# Patient Record
Sex: MALE | Race: WHITE | NOT HISPANIC OR LATINO | Employment: FULL TIME | URBAN - METROPOLITAN AREA
[De-identification: names, ages, dates, MRNs, and addresses within clinical notes are randomized per-mention and may not be internally consistent; named-entity substitution may affect disease eponyms.]

---

## 2019-12-13 ENCOUNTER — HOSPITAL ENCOUNTER (OUTPATIENT)
Dept: URGENT CARE | Facility: CLINIC | Age: 24
Discharge: HOME | End: 2019-12-13
Attending: FAMILY MEDICINE

## 2020-02-10 ENCOUNTER — HOSPITAL ENCOUNTER (OUTPATIENT)
Dept: LAB | Facility: HOSPITAL | Age: 25
Discharge: HOME | End: 2020-02-10
Attending: NURSE PRACTITIONER

## 2020-02-10 LAB
25(OH)D3 SERPL-MCNC: 13.6 NG/ML (ref 30–100)
ALBUMIN SERPL-MCNC: 4.9 G/DL (ref 3.5–5)
ALBUMIN/GLOB SERPL: 1.6 {RATIO} (ref 1.4–2.6)
ALP SERPL-CCNC: 88 U/L (ref 53–128)
ALT SERPL-CCNC: 12 U/L (ref 10–40)
ANION GAP SERPL CALC-SCNC: 18 MMOL/L (ref 8–19)
AST SERPL-CCNC: 14 U/L (ref 15–50)
BASOPHILS # BLD AUTO: 0.04 10*3/UL (ref 0–0.2)
BASOPHILS NFR BLD AUTO: 0.7 % (ref 0–3)
BILIRUB SERPL-MCNC: 0.28 MG/DL (ref 0.2–1.3)
BUN SERPL-MCNC: 15 MG/DL (ref 5–25)
BUN/CREAT SERPL: 17 {RATIO} (ref 6–20)
CALCIUM SERPL-MCNC: 9.5 MG/DL (ref 8.7–10.4)
CHLORIDE SERPL-SCNC: 100 MMOL/L (ref 99–111)
CHOLEST SERPL-MCNC: 147 MG/DL (ref 107–200)
CHOLEST/HDLC SERPL: 4.2 {RATIO} (ref 3–6)
CONV ABS IMM GRAN: 0.02 10*3/UL (ref 0–0.2)
CONV CO2: 27 MMOL/L (ref 22–32)
CONV IMMATURE GRAN: 0.4 % (ref 0–1.8)
CONV TOTAL PROTEIN: 7.9 G/DL (ref 6.3–8.2)
CREAT UR-MCNC: 0.89 MG/DL (ref 0.7–1.2)
DEPRECATED RDW RBC AUTO: 40.2 FL (ref 35.1–43.9)
EOSINOPHIL # BLD AUTO: 0.28 10*3/UL (ref 0–0.7)
EOSINOPHIL # BLD AUTO: 5 % (ref 0–7)
ERYTHROCYTE [DISTWIDTH] IN BLOOD BY AUTOMATED COUNT: 12.3 % (ref 11.6–14.4)
EST. AVERAGE GLUCOSE BLD GHB EST-MCNC: 114 MG/DL
FOLATE SERPL-MCNC: 8.3 NG/ML (ref 4.8–20)
GFR SERPLBLD BASED ON 1.73 SQ M-ARVRAT: >60 ML/MIN/{1.73_M2}
GLOBULIN UR ELPH-MCNC: 3 G/DL (ref 2–3.5)
GLUCOSE SERPL-MCNC: 100 MG/DL (ref 70–99)
HBA1C MFR BLD: 5.6 % (ref 3.5–5.7)
HCT VFR BLD AUTO: 43.2 % (ref 42–52)
HDLC SERPL-MCNC: 35 MG/DL (ref 40–60)
HGB BLD-MCNC: 14.2 G/DL (ref 14–18)
IRON SERPL-MCNC: 59 UG/DL (ref 70–180)
LDLC SERPL CALC-MCNC: 103 MG/DL (ref 70–100)
LYMPHOCYTES # BLD AUTO: 1.54 10*3/UL (ref 1–5)
LYMPHOCYTES NFR BLD AUTO: 27.3 % (ref 20–45)
MCH RBC QN AUTO: 29.5 PG (ref 27–31)
MCHC RBC AUTO-ENTMCNC: 32.9 G/DL (ref 33–37)
MCV RBC AUTO: 89.8 FL (ref 80–96)
MONOCYTES # BLD AUTO: 0.56 10*3/UL (ref 0.2–1.2)
MONOCYTES NFR BLD AUTO: 9.9 % (ref 3–10)
NEUTROPHILS # BLD AUTO: 3.21 10*3/UL (ref 2–8)
NEUTROPHILS NFR BLD AUTO: 56.7 % (ref 30–85)
NRBC CBCN: 0 % (ref 0–0.7)
OSMOLALITY SERPL CALC.SUM OF ELEC: 293 MOSM/KG (ref 273–304)
PLATELET # BLD AUTO: 227 10*3/UL (ref 130–400)
PMV BLD AUTO: 11 FL (ref 9.4–12.4)
POTASSIUM SERPL-SCNC: 4.2 MMOL/L (ref 3.5–5.3)
RBC # BLD AUTO: 4.81 10*6/UL (ref 4.7–6.1)
SODIUM SERPL-SCNC: 141 MMOL/L (ref 135–147)
TRIGL SERPL-MCNC: 46 MG/DL (ref 40–150)
TSH SERPL-ACNC: 4.06 M[IU]/L (ref 0.27–4.2)
VIT B12 SERPL-MCNC: 522 PG/ML (ref 211–911)
VLDLC SERPL-MCNC: 9 MG/DL (ref 5–37)
WBC # BLD AUTO: 5.65 10*3/UL (ref 4.8–10.8)

## 2020-05-29 ENCOUNTER — HOSPITAL ENCOUNTER (OUTPATIENT)
Dept: URGENT CARE | Facility: CLINIC | Age: 25
Discharge: HOME | End: 2020-05-29

## 2021-11-19 ENCOUNTER — TELEPHONE (OUTPATIENT)
Dept: FAMILY MEDICINE CLINIC | Age: 26
End: 2021-11-19

## 2021-11-19 NOTE — TELEPHONE ENCOUNTER
PATIENT HAD BLEEDING DURING BOWEL MOVEMENT    I TOLD HIM TO GO TO THE ER AND THE WIFE STATED THAT SHE WOULD TAKE HIM.     SAFETY REPORT DONE.

## 2021-11-21 ENCOUNTER — HOSPITAL ENCOUNTER (EMERGENCY)
Facility: HOSPITAL | Age: 26
Discharge: LEFT BEFORE DISCHARGE | End: 2021-11-21

## 2021-11-21 VITALS
BODY MASS INDEX: 19.98 KG/M2 | DIASTOLIC BLOOD PRESSURE: 78 MMHG | SYSTOLIC BLOOD PRESSURE: 126 MMHG | HEIGHT: 74 IN | WEIGHT: 155.65 LBS | OXYGEN SATURATION: 97 % | HEART RATE: 73 BPM | TEMPERATURE: 98.4 F | RESPIRATION RATE: 14 BRPM

## 2021-11-21 PROCEDURE — 99211 OFF/OP EST MAY X REQ PHY/QHP: CPT

## 2021-11-21 NOTE — ED TRIAGE NOTES
Patient presents to ED with complaints of one episode of blood in his stool on Tuesday. States that he occasionally has blood on toilet paper when he wipes but has not had any other instances since Tuesday. Denies any current bleeding or abdominal pain. States that he currently has a headache.

## 2021-11-30 ENCOUNTER — OFFICE VISIT (OUTPATIENT)
Dept: INTERNAL MEDICINE | Facility: CLINIC | Age: 26
End: 2021-11-30

## 2021-11-30 VITALS
OXYGEN SATURATION: 97 % | TEMPERATURE: 98.3 F | BODY MASS INDEX: 20.03 KG/M2 | SYSTOLIC BLOOD PRESSURE: 120 MMHG | DIASTOLIC BLOOD PRESSURE: 82 MMHG | WEIGHT: 156 LBS | HEART RATE: 106 BPM

## 2021-11-30 DIAGNOSIS — R51.9 CHRONIC NONINTRACTABLE HEADACHE, UNSPECIFIED HEADACHE TYPE: ICD-10-CM

## 2021-11-30 DIAGNOSIS — G89.29 CHRONIC NONINTRACTABLE HEADACHE, UNSPECIFIED HEADACHE TYPE: ICD-10-CM

## 2021-11-30 DIAGNOSIS — Z13.220 LIPID SCREENING: ICD-10-CM

## 2021-11-30 DIAGNOSIS — K64.8 INTERNAL HEMORRHOID: ICD-10-CM

## 2021-11-30 DIAGNOSIS — Z00.00 ANNUAL PHYSICAL EXAM: Primary | ICD-10-CM

## 2021-11-30 DIAGNOSIS — Z13.29 THYROID DISORDER SCREEN: ICD-10-CM

## 2021-11-30 LAB
BASOPHILS # BLD AUTO: 0.06 10*3/MM3 (ref 0–0.2)
BASOPHILS NFR BLD AUTO: 1 % (ref 0–1.5)
DEPRECATED RDW RBC AUTO: 41.4 FL (ref 37–54)
EOSINOPHIL # BLD AUTO: 0.42 10*3/MM3 (ref 0–0.4)
EOSINOPHIL NFR BLD AUTO: 7.3 % (ref 0.3–6.2)
ERYTHROCYTE [DISTWIDTH] IN BLOOD BY AUTOMATED COUNT: 12.5 % (ref 12.3–15.4)
HCT VFR BLD AUTO: 47.4 % (ref 37.5–51)
HGB BLD-MCNC: 15.3 G/DL (ref 13–17.7)
IMM GRANULOCYTES # BLD AUTO: 0.01 10*3/MM3 (ref 0–0.05)
IMM GRANULOCYTES NFR BLD AUTO: 0.2 % (ref 0–0.5)
LYMPHOCYTES # BLD AUTO: 1.89 10*3/MM3 (ref 0.7–3.1)
LYMPHOCYTES NFR BLD AUTO: 32.8 % (ref 19.6–45.3)
MCH RBC QN AUTO: 29.3 PG (ref 26.6–33)
MCHC RBC AUTO-ENTMCNC: 32.3 G/DL (ref 31.5–35.7)
MCV RBC AUTO: 90.8 FL (ref 79–97)
MONOCYTES # BLD AUTO: 0.55 10*3/MM3 (ref 0.1–0.9)
MONOCYTES NFR BLD AUTO: 9.5 % (ref 5–12)
NEUTROPHILS NFR BLD AUTO: 2.84 10*3/MM3 (ref 1.7–7)
NEUTROPHILS NFR BLD AUTO: 49.2 % (ref 42.7–76)
NRBC BLD AUTO-RTO: 0 /100 WBC (ref 0–0.2)
PLATELET # BLD AUTO: 238 10*3/MM3 (ref 140–450)
PMV BLD AUTO: 10.7 FL (ref 6–12)
RBC # BLD AUTO: 5.22 10*6/MM3 (ref 4.14–5.8)
WBC NRBC COR # BLD: 5.77 10*3/MM3 (ref 3.4–10.8)

## 2021-11-30 PROCEDURE — 99385 PREV VISIT NEW AGE 18-39: CPT | Performed by: NURSE PRACTITIONER

## 2021-11-30 PROCEDURE — 85025 COMPLETE CBC W/AUTO DIFF WBC: CPT | Performed by: NURSE PRACTITIONER

## 2021-11-30 PROCEDURE — 84443 ASSAY THYROID STIM HORMONE: CPT | Performed by: NURSE PRACTITIONER

## 2021-11-30 PROCEDURE — 36415 COLL VENOUS BLD VENIPUNCTURE: CPT | Performed by: NURSE PRACTITIONER

## 2021-11-30 PROCEDURE — 80061 LIPID PANEL: CPT | Performed by: NURSE PRACTITIONER

## 2021-11-30 PROCEDURE — 80053 COMPREHEN METABOLIC PANEL: CPT | Performed by: NURSE PRACTITIONER

## 2021-11-30 RX ORDER — DOCUSATE SODIUM 100 MG/1
100 CAPSULE, LIQUID FILLED ORAL 2 TIMES DAILY
Qty: 30 CAPSULE | Refills: 2 | Status: SHIPPED | OUTPATIENT
Start: 2021-11-30

## 2021-11-30 NOTE — PROGRESS NOTES
Chief Complaint  Establish Care (7 month period of blood when having bowel movement )    Subjective         Adarsh Velez presents to [facility] for Establishment of care, annual physical exam, questions about anal bleeding, and headaches.    Patient is here to establish care for primary care, he has not had any significant primary care since he was a teenager.  He does not report any significant long-term medical conditions, he does not take any medications on a regular basis, he is only allergic to shellfish to his knowledge.  His only complaints today or recurrent headaches, he reports he gets 2-3 of these weekly, sometimes they do put him in a state where he can only go to a quiet dark room and go to sleep for several hours.  He has not had any formal work-up for migraines, he does not typically take medication with them, if he does this usually ibuprofen.  He also reports bleeding with bowel movements around his anus.  He states that over the last several months he has noticed bright red blood with some of his bowel movements, he notices bright red blood on the toilet paper when wiping.  He did attempt to go to the ER 2 weeks ago, the bleeding at that time was more significant, it was dripping down his leg so his girlfriend got worried and they went.  However, the weight was quite extensive at the time so they left without being seen.  He is only had one episode since then.  And it was very mild.  Otherwise there are no significant concerns or complaints today.         Review of Systems   Constitutional: Negative for chills, fatigue and fever.   HENT: Negative for congestion, sinus pressure and sore throat.    Respiratory: Negative for cough and shortness of breath.    Cardiovascular: Negative for chest pain and palpitations.   Gastrointestinal: Positive for blood in stool. Negative for abdominal pain, diarrhea, nausea and vomiting.   Skin: Negative for rash.   Neurological:  Negative for weakness and headaches.   Psychiatric/Behavioral: Negative for dysphoric mood. The patient is not nervous/anxious.        Objective   Vital Signs:   /82   Pulse 106   Temp 98.3 °F (36.8 °C)   Wt 70.8 kg (156 lb)   SpO2 97%   BMI 20.03 kg/m²     Physical Exam  Vitals and nursing note reviewed.   Constitutional:       Appearance: Normal appearance. He is normal weight.   HENT:      Head: Normocephalic and atraumatic.      Right Ear: Tympanic membrane, ear canal and external ear normal.      Left Ear: Tympanic membrane, ear canal and external ear normal.      Nose: Nose normal.      Mouth/Throat:      Mouth: Mucous membranes are moist.      Pharynx: Oropharynx is clear.   Eyes:      Conjunctiva/sclera: Conjunctivae normal.      Pupils: Pupils are equal, round, and reactive to light.   Cardiovascular:      Rate and Rhythm: Normal rate and regular rhythm.   Pulmonary:      Effort: Pulmonary effort is normal.      Breath sounds: Normal breath sounds.   Abdominal:      General: Abdomen is flat. Bowel sounds are normal.      Palpations: Abdomen is soft.   Genitourinary:     Comments: Patient denies rectal exam, he does not report any active bleeding or lumps or bumps around the anus at this time.  Skin:     General: Skin is warm and dry.   Neurological:      General: No focal deficit present.      Mental Status: He is alert.   Psychiatric:         Mood and Affect: Mood normal.         Thought Content: Thought content normal.        Result Review :                   Diagnoses and all orders for this visit:    1. Annual physical exam (Primary)  Assessment & Plan:  Annual physical exam completed at this time.  No significant abnormal findings.  We will follow-up based on lab work results as needed.  We will see again in 1 year for annual physical exam.    Orders:  -     Comprehensive Metabolic Panel  -     CBC & Differential    2. Lipid screening  -     Lipid Panel    3. Thyroid disorder screen  -      TSH    4. Internal hemorrhoid  Assessment & Plan:  Patient with most likely internal hemorrhoid, he does not report any lumps or bumps on his home examination of the anus.  No active bleeding today.  We will try docusate and see if that helps reduce bleeding.  He does not report any significant pain or itching at this time.  We will follow-up in 3 months.      5. Chronic nonintractable headache, unspecified headache type  Assessment & Plan:  Patient with reported chronic headaches.  I did encourage him at this time to decrease caffeine use, he is currently drinking several Mountain Dew drinks daily.  I did encourage him to increase his water intake.  At this time I advised him to keep Excedrin over-the-counter migraine medication handy, and whenever he feels a headache coming on he should take as early as possible.  He agrees to this plan at this time.  If these become more frequent or more significant he was advised to follow-up.      Other orders  -     docusate sodium (Colace) 100 MG capsule; Take 1 capsule by mouth 2 (Two) Times a Day.  Dispense: 30 capsule; Refill: 2        Follow Up   No follow-ups on file.  Patient was given instructions and counseling regarding his condition or for health maintenance advice. Please see specific information pulled into the AVS if appropriate.     Screening labs reviewed/ordered  Counseling provided regarding age appropriate screenings and immunizations, healthy diet and exercise.         SONIA Maza  11/30/2021  This note was electronically signed.

## 2021-11-30 NOTE — ASSESSMENT & PLAN NOTE
Patient with most likely internal hemorrhoid, he does not report any lumps or bumps on his home examination of the anus.  No active bleeding today.  We will try docusate and see if that helps reduce bleeding.  He does not report any significant pain or itching at this time.  We will follow-up in 3 months.

## 2021-11-30 NOTE — ASSESSMENT & PLAN NOTE
Patient with reported chronic headaches.  I did encourage him at this time to decrease caffeine use, he is currently drinking several Mountain Dew drinks daily.  I did encourage him to increase his water intake.  At this time I advised him to keep Excedrin over-the-counter migraine medication handy, and whenever he feels a headache coming on he should take as early as possible.  He agrees to this plan at this time.  If these become more frequent or more significant he was advised to follow-up.

## 2021-11-30 NOTE — ASSESSMENT & PLAN NOTE
Annual physical exam completed at this time.  No significant abnormal findings.  We will follow-up based on lab work results as needed.  We will see again in 1 year for annual physical exam.

## 2021-12-01 LAB
ALBUMIN SERPL-MCNC: 4.9 G/DL (ref 3.5–5.2)
ALBUMIN/GLOB SERPL: 1.7 G/DL
ALP SERPL-CCNC: 89 U/L (ref 39–117)
ALT SERPL W P-5'-P-CCNC: 11 U/L (ref 1–41)
ANION GAP SERPL CALCULATED.3IONS-SCNC: 8.9 MMOL/L (ref 5–15)
AST SERPL-CCNC: 17 U/L (ref 1–40)
BILIRUB SERPL-MCNC: 0.6 MG/DL (ref 0–1.2)
BUN SERPL-MCNC: 12 MG/DL (ref 6–20)
BUN/CREAT SERPL: 12.8 (ref 7–25)
CALCIUM SPEC-SCNC: 9.5 MG/DL (ref 8.6–10.5)
CHLORIDE SERPL-SCNC: 102 MMOL/L (ref 98–107)
CHOLEST SERPL-MCNC: 195 MG/DL (ref 0–200)
CO2 SERPL-SCNC: 30.1 MMOL/L (ref 22–29)
CREAT SERPL-MCNC: 0.94 MG/DL (ref 0.76–1.27)
GFR SERPL CREATININE-BSD FRML MDRD: 99 ML/MIN/1.73
GLOBULIN UR ELPH-MCNC: 2.9 GM/DL
GLUCOSE SERPL-MCNC: 61 MG/DL (ref 65–99)
HDLC SERPL-MCNC: 39 MG/DL (ref 40–60)
LDLC SERPL CALC-MCNC: 141 MG/DL (ref 0–100)
LDLC/HDLC SERPL: 3.58 {RATIO}
POTASSIUM SERPL-SCNC: 3.8 MMOL/L (ref 3.5–5.2)
PROT SERPL-MCNC: 7.8 G/DL (ref 6–8.5)
SODIUM SERPL-SCNC: 141 MMOL/L (ref 136–145)
TRIGL SERPL-MCNC: 82 MG/DL (ref 0–150)
TSH SERPL DL<=0.05 MIU/L-ACNC: 1.45 UIU/ML (ref 0.27–4.2)
VLDLC SERPL-MCNC: 15 MG/DL (ref 5–40)

## 2021-12-16 ENCOUNTER — OFFICE VISIT (OUTPATIENT)
Dept: INTERNAL MEDICINE | Facility: CLINIC | Age: 26
End: 2021-12-16

## 2021-12-16 VITALS
BODY MASS INDEX: 20.53 KG/M2 | SYSTOLIC BLOOD PRESSURE: 110 MMHG | DIASTOLIC BLOOD PRESSURE: 76 MMHG | RESPIRATION RATE: 18 BRPM | WEIGHT: 160 LBS | TEMPERATURE: 98.1 F | HEIGHT: 74 IN | HEART RATE: 86 BPM | OXYGEN SATURATION: 99 %

## 2021-12-16 DIAGNOSIS — R30.0 DYSURIA: Primary | ICD-10-CM

## 2021-12-16 LAB
BILIRUB UR QL STRIP: NEGATIVE
C TRACH RRNA CVX QL NAA+PROBE: NOT DETECTED
CLARITY UR: CLEAR
COLOR UR: ABNORMAL
GLUCOSE UR STRIP-MCNC: NEGATIVE MG/DL
HGB UR QL STRIP.AUTO: NEGATIVE
KETONES UR QL STRIP: NEGATIVE
LEUKOCYTE ESTERASE UR QL STRIP.AUTO: NEGATIVE
N GONORRHOEA RRNA SPEC QL NAA+PROBE: NOT DETECTED
NITRITE UR QL STRIP: NEGATIVE
PH UR STRIP.AUTO: 5.5 [PH] (ref 5–8)
PROT UR QL STRIP: NEGATIVE
SP GR UR STRIP: >=1.03 (ref 1–1.03)
UROBILINOGEN UR QL STRIP: ABNORMAL

## 2021-12-16 PROCEDURE — 87591 N.GONORRHOEAE DNA AMP PROB: CPT | Performed by: NURSE PRACTITIONER

## 2021-12-16 PROCEDURE — 99213 OFFICE O/P EST LOW 20 MIN: CPT | Performed by: NURSE PRACTITIONER

## 2021-12-16 PROCEDURE — 87491 CHLMYD TRACH DNA AMP PROBE: CPT | Performed by: NURSE PRACTITIONER

## 2021-12-16 NOTE — ASSESSMENT & PLAN NOTE
We did get UA and will test for GC chlamydia. No acute signs or symptoms reported today. Partner was in the room today, she is also a patient of mine, she has not yet completed her clindamycin, she was scared to take it. She has gone untreated now for approximately 3 weeks. She was prescribed a Cleocin cream, however her insurance would not cover it. I did advise her that she needs to go ahead and complete that course, she had failed Flagyl three times already. This is her next best option in treating. I will follow up based on urine results.

## 2021-12-16 NOTE — PROGRESS NOTES
"Chief Complaint  STD testing (partner has tested + 3xs)    Subjective         Adarsh Velez presents to [facility] for Testing of his urine. Patient reports that his significant other has had recurrent bacterial vaginosis for the last 2 to 3 months, she has failed treatment with typical medication three times. She was most recently seen in the emergency department, they advised her to have her partner to be tested for any other types of bacteria. She is here with him today, they both report that they are monogamous with one another, there are no reported signs of sexually transmitted infections, however they would prefer to be tested today for common causes. Patient is also wanting his urine tested to see if there is any bacteria.         Review of Systems   Constitutional: Negative for chills, fatigue and fever.   Gastrointestinal: Negative for abdominal pain, diarrhea, nausea and vomiting.   Genitourinary: Negative for dysuria, frequency, penile discharge, penile pain, penile swelling, scrotal swelling and urgency.   Skin: Negative for rash.       Objective   Vital Signs:   /76 (BP Location: Left arm, Patient Position: Sitting)   Pulse 86   Temp 98.1 °F (36.7 °C) (Oral)   Resp 18   Ht 188 cm (74.02\")   Wt 72.6 kg (160 lb)   SpO2 99%   BMI 20.53 kg/m²     Physical Exam  Vitals and nursing note reviewed.   Constitutional:       Appearance: Normal appearance. He is obese.   HENT:      Head: Normocephalic and atraumatic.   Pulmonary:      Effort: Pulmonary effort is normal.   Neurological:      Mental Status: He is alert.   Psychiatric:         Mood and Affect: Mood normal.        Result Review :                   Diagnoses and all orders for this visit:    1. Dysuria (Primary)  Assessment & Plan:  We did get UA and will test for GC chlamydia. No acute signs or symptoms reported today. Partner was in the room today, she is also a patient of mine, she has not yet completed " her clindamycin, she was scared to take it. She has gone untreated now for approximately 3 weeks. She was prescribed a Cleocin cream, however her insurance would not cover it. I did advise her that she needs to go ahead and complete that course, she had failed Flagyl three times already. This is her next best option in treating. I will follow up based on urine results.    Orders:  -     Urinalysis With Culture If Indicated -; Future  -     Chlamydia trachomatis, Neisseria gonorrhoeae, PCR - Urine, Urine, Clean Catch  -     Urinalysis With Culture If Indicated - Urine, Clean Catch        Follow Up   No follow-ups on file.  Patient was given instructions and counseling regarding his condition or for health maintenance advice. Please see specific information pulled into the AVS if appropriate.     SONIA Maza  12/16/2021  This note was electronically signed.

## 2021-12-17 LAB — BACTERIA SPEC AEROBE CULT: NO GROWTH

## 2022-03-03 ENCOUNTER — TELEPHONE (OUTPATIENT)
Dept: INTERNAL MEDICINE | Facility: CLINIC | Age: 27
End: 2022-03-03

## 2022-03-03 NOTE — TELEPHONE ENCOUNTER
Caller: FLOR MARTINEZ    Relationship: Emergency Contact    Best call back number: [phone]    What is the best time to reach you: ANY    Who are you requesting to speak with (clinical staff, provider,  specific staff member): CLINICAL  What was the call regarding: PATIENT'S WIFE (MRN:[id number]) WAS REFERRED BY [facility] TO SEE OBGYN FOR ADDITIONAL TESTING AND WAS DIAGNOSED WITH UREAPLASMA. WIFE WAS TOLD TO CALL PCP AND REQUESTED TREATMENT MEDICATION FOR PATIENT TO PREVENT REINFECTION. PLEASE CALL AND ADVISE.     Do you require a callback: YES      [id number] - [place] - [address] - [phone] [facility] [phone] FX  [phone]

## 2022-03-04 ENCOUNTER — TELEPHONE (OUTPATIENT)
Dept: INTERNAL MEDICINE | Facility: CLINIC | Age: 27
End: 2022-03-04

## 2022-03-04 NOTE — TELEPHONE ENCOUNTER
Caller: FLOR MARTINEZ    Relationship: Emergency Contact    Best call back number: [phone]    What medication are you requesting: MEDICATION FOR UREAPLASMA INFECTION    Have you had these symptoms before:    [] Yes  [x] No    Have you been treated for these symptoms before:   [] Yes  [x] No    If a prescription is needed, what is your preferred pharmacy and phone number: [id number] - [place], [place] - [address] [facility] [phone] [facility] [phone] FX     Additional notes:  PATIENT'S WIFE STATED THAT PHARMACY HAS NOT RECEIVED ANYTHING YET, SHE IS REQUESTING THIS SENT TO PHARMACY AS SOON AS POSSIBLE.

## 2022-03-06 RX ORDER — DOXYCYCLINE HYCLATE 100 MG/1
100 CAPSULE ORAL 2 TIMES DAILY
Qty: 14 CAPSULE | Refills: 0 | Status: SHIPPED | OUTPATIENT
Start: 2022-03-06 | End: 2022-06-23 | Stop reason: SDUPTHER

## 2022-03-07 NOTE — TELEPHONE ENCOUNTER
Talked to, and informed pt.  He asked about using protection, and I reiterated that no sex until meds were finished.

## 2022-03-22 ENCOUNTER — OFFICE VISIT (OUTPATIENT)
Dept: INTERNAL MEDICINE | Facility: CLINIC | Age: 27
End: 2022-03-22

## 2022-03-22 VITALS
SYSTOLIC BLOOD PRESSURE: 122 MMHG | OXYGEN SATURATION: 97 % | RESPIRATION RATE: 12 BRPM | HEIGHT: 74 IN | BODY MASS INDEX: 20.43 KG/M2 | WEIGHT: 159.2 LBS | TEMPERATURE: 97.4 F | HEART RATE: 76 BPM | DIASTOLIC BLOOD PRESSURE: 74 MMHG

## 2022-03-22 DIAGNOSIS — R21 RASH: Primary | ICD-10-CM

## 2022-03-22 PROCEDURE — 99213 OFFICE O/P EST LOW 20 MIN: CPT | Performed by: NURSE PRACTITIONER

## 2022-03-22 RX ORDER — PREDNISONE 20 MG/1
40 TABLET ORAL DAILY
Qty: 10 TABLET | Refills: 0 | Status: SHIPPED | OUTPATIENT
Start: 2022-03-22

## 2022-03-22 NOTE — PROGRESS NOTES
"Chief Complaint  Rash (On chest, going up to neck.)    Subjective         Adarsh Velez presents to [facility] for Rash.  Patient reports that over the last week he has had rash on his upper torso and on his left lower neck area.  Patient denies any significant changes with soaps, lotions, detergents, clothing.  Patient was previously prescribed doxycycline, he completed the medication around the same time that the rash appeared.  Patient denies any significant itching.  There is no rash located elsewhere on the body.  No other significant concerns or complaints today.         Review of Systems   Constitutional: Negative for fever.   HENT: Negative for sore throat, trouble swallowing and voice change.    Respiratory: Negative for cough, shortness of breath and wheezing.    Cardiovascular: Negative for chest pain.   Gastrointestinal: Negative for diarrhea, nausea and vomiting.   Skin: Positive for rash.   Neurological: Negative for headaches.       Objective   Vital Signs:   /74   Pulse 76   Temp 97.4 °F (36.3 °C) (Temporal)   Resp 12   Ht 188 cm (74\")   Wt 72.2 kg (159 lb 3.2 oz)   SpO2 97%   BMI 20.44 kg/m²     Physical Exam  Vitals and nursing note reviewed.   Constitutional:       Appearance: Normal appearance. He is normal weight.   HENT:      Head: Normocephalic and atraumatic.      Nose: Nose normal.      Mouth/Throat:      Mouth: Mucous membranes are moist.      Pharynx: Oropharynx is clear.   Eyes:      Conjunctiva/sclera: Conjunctivae normal.      Pupils: Pupils are equal, round, and reactive to light.   Pulmonary:      Effort: Pulmonary effort is normal.      Breath sounds: Normal breath sounds.   Skin:     General: Skin is warm and dry.      Comments: Rash noted to upper torso and left lower neck area.   Neurological:      Mental Status: He is alert.   Psychiatric:         Mood and Affect: Mood normal.        Result Review :                   Diagnoses and " all orders for this visit:    1. Rash (Primary)  Assessment & Plan:  Most likely allergic in nature, will send in steroids.  Advised patient to follow-up if symptoms worsen.      Other orders  -     predniSONE (DELTASONE) 20 MG tablet; Take 2 tablets by mouth Daily.  Dispense: 10 tablet; Refill: 0        Follow Up   Return if symptoms worsen or fail to improve.  Patient was given instructions and counseling regarding his condition or for health maintenance advice. Please see specific information pulled into the AVS if appropriate.     SONIA Maza  3/22/2022  This note was electronically signed.

## 2022-03-22 NOTE — ASSESSMENT & PLAN NOTE
Most likely allergic in nature, will send in steroids.  Advised patient to follow-up if symptoms worsen.

## 2022-04-12 ENCOUNTER — OFFICE VISIT (OUTPATIENT)
Dept: INTERNAL MEDICINE | Facility: CLINIC | Age: 27
End: 2022-04-12

## 2022-04-12 VITALS
RESPIRATION RATE: 14 BRPM | HEART RATE: 77 BPM | TEMPERATURE: 96.7 F | WEIGHT: 158 LBS | DIASTOLIC BLOOD PRESSURE: 89 MMHG | HEIGHT: 74 IN | BODY MASS INDEX: 20.28 KG/M2 | SYSTOLIC BLOOD PRESSURE: 129 MMHG | OXYGEN SATURATION: 100 %

## 2022-04-12 DIAGNOSIS — R35.0 FREQUENT URINATION: Primary | ICD-10-CM

## 2022-04-12 DIAGNOSIS — L84 FOOT CALLUS: ICD-10-CM

## 2022-04-12 LAB
BACTERIA UR QL AUTO: ABNORMAL /HPF
BILIRUB UR QL STRIP: NEGATIVE
CLARITY UR: CLEAR
COLOR UR: YELLOW
GLUCOSE UR STRIP-MCNC: NEGATIVE MG/DL
HGB UR QL STRIP.AUTO: ABNORMAL
HYALINE CASTS UR QL AUTO: ABNORMAL /LPF
KETONES UR QL STRIP: NEGATIVE
LEUKOCYTE ESTERASE UR QL STRIP.AUTO: NEGATIVE
NITRITE UR QL STRIP: NEGATIVE
PH UR STRIP.AUTO: 8.5 [PH] (ref 5–8)
PROT UR QL STRIP: NEGATIVE
RBC # UR STRIP: ABNORMAL /HPF
REF LAB TEST METHOD: ABNORMAL
SP GR UR STRIP: 1.02 (ref 1–1.03)
SQUAMOUS #/AREA URNS HPF: ABNORMAL /HPF
UROBILINOGEN UR QL STRIP: ABNORMAL
WBC # UR STRIP: ABNORMAL /HPF

## 2022-04-12 PROCEDURE — 87086 URINE CULTURE/COLONY COUNT: CPT | Performed by: NURSE PRACTITIONER

## 2022-04-12 PROCEDURE — 81001 URINALYSIS AUTO W/SCOPE: CPT | Performed by: NURSE PRACTITIONER

## 2022-04-12 PROCEDURE — 99213 OFFICE O/P EST LOW 20 MIN: CPT | Performed by: NURSE PRACTITIONER

## 2022-04-12 NOTE — ASSESSMENT & PLAN NOTE
Discussed good foot care, including cleaning, drying, lotions, removing skin, good footwear, and changing of socks.  If patient has further issues could look at sending to podiatry.

## 2022-04-12 NOTE — ASSESSMENT & PLAN NOTE
UA not overly concerning for acute infection, will go ahead and culture.  Will follow up based on results.

## 2022-04-12 NOTE — PROGRESS NOTES
"Chief Complaint  Urinary Frequency and Foot Pain    Subjective         Adarsh Velez [place] [facility] for Concerns of urinary frequency and bilateral skin issues on feet.    Patient is here today with his wife, she reports patient has had frequent urination for the last couple weeks.  He denies any other significant symptoms.  Patient has recently changed from drinking more water than soda.  He just feels like he is urinating more often than previously.    Patient also curious about what to do with his feet, he feels like they are dry and they have lots of extra skin on them.  They do occasionally hurt after working on them long hours.         Review of Systems    Objective   Vital Signs:   /89   Pulse 77   Temp 96.7 °F (35.9 °C) (Temporal)   Resp 14   Ht 188 cm (74\")   Wt 71.7 kg (158 lb)   SpO2 100%   BMI 20.29 kg/m²     Physical Exam  Vitals and nursing note reviewed.   Constitutional:       Appearance: Normal appearance. He is normal weight.   HENT:      Head: Normocephalic and atraumatic.   Eyes:      Pupils: Pupils are equal, round, and reactive to light.   Pulmonary:      Effort: Pulmonary effort is normal.   Skin:     General: Skin is warm and dry.   Neurological:      Mental Status: He is alert.   Psychiatric:         Mood and Affect: Mood normal.        Result Review :              Results for orders placed or performed in visit on 04/12/22   Urinalysis With Culture If Indicated - Urine, Clean Catch    Specimen: Urine, Clean Catch   Result Value Ref Range    Color, UA Yellow Yellow, Straw    Appearance, UA Clear Clear    pH, UA 8.5 (H) 5.0 - 8.0    Specific Gravity, UA 1.020 1.005 - 1.030    Glucose, UA Negative Negative    Ketones, UA Negative Negative    Bilirubin, UA Negative Negative    Blood, UA Trace (A) Negative    Protein, UA Negative Negative    Leuk Esterase, UA Negative Negative    Nitrite, UA Negative Negative    Urobilinogen, UA 0.2 E.U./dL " 0.2 - 1.0 E.U./dL            Diagnoses and all orders for this visit:    1. Frequent urination (Primary)  Assessment & Plan:  UA not overly concerning for acute infection, will go ahead and culture.  Will follow up based on results.    Orders:  -     Urinalysis With Culture If Indicated - Urine, Clean Catch  -     Urinalysis, Microscopic Only - Urine, Clean Catch  -     Urine Culture - Urine, Urine, Clean Catch    2. Foot callus  Assessment & Plan:  Discussed good foot care, including cleaning, drying, lotions, removing skin, good footwear, and changing of socks.  If patient has further issues could look at sending to podiatry.          Follow Up   Return if symptoms worsen or fail to improve.  Patient was given instructions and counseling regarding his condition or for health maintenance advice. Please see specific information pulled into the AVS if appropriate.     SONIA Maza  4/12/2022  This note was electronically signed.

## 2022-04-13 LAB — BACTERIA SPEC AEROBE CULT: NO GROWTH

## 2022-04-26 ENCOUNTER — TELEPHONE (OUTPATIENT)
Dept: INTERNAL MEDICINE | Facility: CLINIC | Age: 27
End: 2022-04-26

## 2022-04-26 NOTE — TELEPHONE ENCOUNTER
Caller: FLOR MARTINEZ    Relationship: Emergency Contact    Best call back number: [phone]     What medication are you requesting: AZITHROMYCIN    What are your current symptoms: URIA PLASMA INFECTION    How long have you been experiencing symptoms:     Have you had these symptoms before:    [x] Yes  [] No    Have you been treated for these symptoms before:   [x] Yes  [] No    If a prescription is needed, what is your preferred pharmacy and phone number: [facility] #[id number] - [place] - [address] [facility] [phone] [facility] [phone] FX     Additional notes:WIFE'S OB/GYN STATED PATIENT AND WIFE BOTH NEEDS TO BE ON THIS MEDICATION. WIFE ALREADY HAS PRESCRIPTION.

## 2022-04-29 RX ORDER — AZITHROMYCIN 250 MG/1
TABLET, FILM COATED ORAL
Qty: 6 TABLET | Refills: 0 | Status: SHIPPED | OUTPATIENT
Start: 2022-04-29 | End: 2022-05-04

## 2022-05-05 ENCOUNTER — HOSPITAL ENCOUNTER (EMERGENCY)
Facility: HOSPITAL | Age: 27
Discharge: HOME | End: 2022-05-05
Attending: EMERGENCY MEDICINE | Admitting: EMERGENCY MEDICINE

## 2022-05-05 VITALS
BODY MASS INDEX: 20.83 KG/M2 | DIASTOLIC BLOOD PRESSURE: 86 MMHG | HEIGHT: 73 IN | SYSTOLIC BLOOD PRESSURE: 140 MMHG | RESPIRATION RATE: 18 BRPM | WEIGHT: 157.19 LBS | HEART RATE: 78 BPM | OXYGEN SATURATION: 100 % | TEMPERATURE: 97.9 F

## 2022-05-05 DIAGNOSIS — K12.0 APHTHOUS ULCER OF MOUTH: ICD-10-CM

## 2022-05-05 DIAGNOSIS — G89.29 CHRONIC DENTAL PAIN: Primary | ICD-10-CM

## 2022-05-05 DIAGNOSIS — K08.9 CHRONIC DENTAL PAIN: Primary | ICD-10-CM

## 2022-05-05 PROCEDURE — 99283 EMERGENCY DEPT VISIT LOW MDM: CPT

## 2022-05-05 RX ORDER — ACETAMINOPHEN 160 MG/5ML
650 SOLUTION ORAL ONCE
Status: COMPLETED | OUTPATIENT
Start: 2022-05-05 | End: 2022-05-05

## 2022-05-05 RX ORDER — KETOROLAC TROMETHAMINE 30 MG/ML
30 INJECTION, SOLUTION INTRAMUSCULAR; INTRAVENOUS ONCE
Status: DISCONTINUED | OUTPATIENT
Start: 2022-05-05 | End: 2022-05-05

## 2022-05-05 RX ORDER — LIDOCAINE HYDROCHLORIDE 20 MG/ML
5 SOLUTION OROPHARYNGEAL
Status: DISCONTINUED | OUTPATIENT
Start: 2022-05-05 | End: 2022-05-06 | Stop reason: HOSPADM

## 2022-05-05 RX ADMIN — LIDOCAINE HYDROCHLORIDE 5 ML: 20 SOLUTION ORAL at 23:15

## 2022-05-05 RX ADMIN — ACETAMINOPHEN ORAL SOLUTION 649.6 MG: 650 SOLUTION ORAL at 23:15

## 2022-05-05 RX ADMIN — BENZOCAINE 2 SPRAY: 200 SPRAY DENTAL; ORAL; PERIODONTAL at 23:16

## 2022-05-06 NOTE — ED PROVIDER NOTES
Subjective   Patient is a 26-year-old male resenting today due to dental pain for the past 7 months.  Patient's wife states that they called [facility] month ago and schedule an appointment,  when he went to the appointment they stated that he did not have an appointment for that date and pt did not reschedule due to his work.  Patient states that he will have dental insurance starting in June.  Patient denies smoking or chewing tobacco.  Patient's wife is concerned about an ulcer on the bottom right side of his gumline.  Patient's wife states that he has tried Orajel. patient denies fever, chills, nausea vomiting.       History provided by:  Patient   used: No    Dental Pain  Location:  Upper and lower  Quality:  Aching  Severity:  Moderate  Timing:  Constant  Progression:  Worsening  Context: dental fracture and poor dentition    Context: not abscess    Relieved by:  Nothing  Ineffective treatments:  Topical anesthetic gel  Associated symptoms: no difficulty swallowing, no drooling, no facial swelling, no fever, no gum swelling, no neck swelling and no oral bleeding    Risk factors: lack of dental care    Risk factors: no chewing tobacco use and no smoking        Review of Systems   Constitutional: Negative.  Negative for fever.   HENT: Positive for dental problem. Negative for drooling and facial swelling.    Eyes: Negative.    Respiratory: Negative.    Cardiovascular: Negative.    Gastrointestinal: Negative.    Endocrine: Negative.    Genitourinary: Negative.    Musculoskeletal: Negative.    Skin: Negative.    Allergic/Immunologic: Negative.    Neurological: Negative.    Hematological: Negative.    Psychiatric/Behavioral: Negative.        No past medical history on file.    Allergies   Allergen Reactions   • Shellfish-Derived Products Swelling       No past surgical history on file.    No family history on file.    Social History     Socioeconomic History   • Marital status:     Tobacco Use   • Smoking status: Never Smoker   • Smokeless tobacco: Never Used   Vaping Use   • Vaping Use: Never used   Substance and Sexual Activity   • Drug use: Never           Objective   Physical Exam  Vitals and nursing note reviewed.   Constitutional:       Appearance: Normal appearance.   HENT:      Head: Normocephalic and atraumatic.      Nose: Nose normal.      Mouth/Throat:      Mouth: Mucous membranes are moist. No oral lesions.      Dentition: Abnormal dentition. Dental tenderness present. No gingival swelling or dental abscesses.      Tongue: No lesions. Tongue does not deviate from midline.      Palate: No mass and lesions.      Pharynx: Oropharynx is clear.     Eyes:      Extraocular Movements: Extraocular movements intact.      Conjunctiva/sclera: Conjunctivae normal.      Pupils: Pupils are equal, round, and reactive to light.   Cardiovascular:      Rate and Rhythm: Normal rate and regular rhythm.   Pulmonary:      Effort: Pulmonary effort is normal.      Breath sounds: Normal breath sounds.   Musculoskeletal:         General: Normal range of motion.      Cervical back: Normal range of motion and neck supple.   Skin:     General: Skin is warm and dry.   Neurological:      General: No focal deficit present.      Mental Status: He is alert and oriented to person, place, and time.   Psychiatric:         Mood and Affect: Mood normal.         Behavior: Behavior normal.         Procedures           ED Course                                                 MDM  Number of Diagnoses or Management Options  Aphthous ulcer of mouth  Chronic dental pain  Diagnosis management comments: Discussed with patient and his wife that he will need to take a day off of work if possible to follow-up with a dentist for further evaluation and teeth extractions needed.   Gave patient and his wife [facility] [employer] [facility] contact information, discussed that they could work with them for payment plan and possibly take  insurance as well.    Wife stated that she will get him into a dentist [facility].     I have spoken with patient. I have explained the patient´s condition, diagnoses and treatment plan based on the information available to me at this time. I have answered the patient's questions and addressed any concerns. The patient has a good  understanding of the patient´s diagnosis, condition, and treatment plan as can be expected at this point. The vital signs have been stable. The patient´s condition is stable and appropriate for discharge from the emergency department.      The patient will pursue further outpatient evaluation with the primary care physician or other designated or consulting physician as outlined in the discharge instructions. They are agreeable to this plan of care and follow-up instructions have been explained in detail. The patient has received these instructions in written format and have expressed an understanding of the discharge instructions. The patient is aware that any significant change in condition or worsening of symptoms should prompt an immediate return to this or the closest emergency department or call to 911.      Risk of Complications, Morbidity, and/or Mortality  Presenting problems: low  Diagnostic procedures: low  Management options: low    Patient Progress  Patient progress: stable      Final diagnoses:   Chronic dental pain   Aphthous ulcer of mouth       ED Disposition  ED Disposition     ED Disposition   Discharge    Condition   Stable    Comment   --             [facility]  [facility] [address] [place] [address]  [phone]  Schedule an appointment as soon as possible for a visit            Medication List      Stop    azithromycin 250 MG tablet  Commonly known as: Zithromax TRISH-Aure Cabrera PA-C  05/05/22 0775

## 2022-05-06 NOTE — DISCHARGE INSTRUCTIONS
You can take Tylenol or Motrin as needed for pain do not exceed 2400 mg of Motrin in 24 hours.    Please use Orajel as needed  Please follow-up with a dentist or call [facility] [employer] [facility] for further evaluation

## 2022-06-22 ENCOUNTER — TELEPHONE (OUTPATIENT)
Dept: INTERNAL MEDICINE | Facility: CLINIC | Age: 27
End: 2022-06-22

## 2022-06-22 NOTE — TELEPHONE ENCOUNTER
Caller: FLOR MARTINEZ    Relationship: Emergency Contact    Best call back number:[phone]      What medication are you requesting: AZITHROMYCIN    What are your current symptoms: URIA PLASMA INFECTION    How long have you been experiencing symptoms:      Have you had these symptoms before:    [x] Yes  [] No    Have you been treated for these symptoms before:   [x] Yes  [] No    If a prescription is needed, what is your preferred pharmacy and phone number:      [facility]/pharmacy #[id number] - [place] - [address] - [phone] [facility] [phone]   [phone]    armando notes: PATIENT SPOUSE REQUESTING FOR MEDICATION.

## 2022-06-22 NOTE — TELEPHONE ENCOUNTER
Called patient's wife back to inquire about symptoms. Patient's wife states he has been treated by you for this before and you would send in medication for him without being seen. Patient refuses to come into the office for an appointment. I explained his PCP would be out of the office until next week and she verbalized understanding.

## 2022-06-23 RX ORDER — DOXYCYCLINE HYCLATE 100 MG/1
100 CAPSULE ORAL 2 TIMES DAILY
Qty: 14 CAPSULE | Refills: 0 | Status: SHIPPED | OUTPATIENT
Start: 2022-06-23 | End: 2022-06-27

## 2022-06-24 NOTE — TELEPHONE ENCOUNTER
WIFE STATES THAT THE WRONG MEDICATION WAS SENT OVER TO THE PHARMACY STATES THAT PATIENT HAS HAD PREVIOUS REACTIONS TO DOXYCYCLINE WITH A RASH ON CHEST  AND THAT SHE STATES THAT HE NEEDS A   Z PACK INSTEAD AND THAT IS WHAT WAS REQUESTED.

## 2022-06-24 NOTE — TELEPHONE ENCOUNTER
Caller: FLOR MARTINEZ    Relationship: Emergency Contact    Best call back number: [phone]    What is the best time to reach you: ANYTIME     Who are you requesting to speak with (clinical staff, provider,  specific staff member):CLINICAL         What was the call regarding: WIFE STATES THAT THE WRONG MEDICATION WAS SENT OVER TO THE PHARMACY STATES THAT PATIENT HAS HAD PREVIOUS REACTIONS TO DOXYCYCLINE WITH A RASH ON CHEST  AND THAT SHE STATES THAT HE NEEDS A   Z PACK INSTEAD AND THAT IS WHAT WAS REQUESTED.     PREFERRED PHARMACY [facility]/pharmacy #[id number] - [place], [place] - [address] [facility] [phone] [facility] [phone] FX      Do you require a callback: YES         FLOR MARTINEZ IS ON THE  VERBAL

## 2022-06-27 RX ORDER — AZITHROMYCIN 250 MG/1
TABLET, FILM COATED ORAL
Qty: 6 TABLET | Refills: 0 | Status: SHIPPED | OUTPATIENT
Start: 2022-06-27

## 2022-07-22 ENCOUNTER — HOSPITAL ENCOUNTER (EMERGENCY)
Facility: HOSPITAL | Age: 27
Discharge: HOME | End: 2022-07-22
Admitting: EMERGENCY MEDICINE

## 2022-07-22 VITALS
DIASTOLIC BLOOD PRESSURE: 63 MMHG | RESPIRATION RATE: 18 BRPM | WEIGHT: 163.58 LBS | TEMPERATURE: 97.7 F | BODY MASS INDEX: 20.99 KG/M2 | OXYGEN SATURATION: 99 % | HEIGHT: 74 IN | SYSTOLIC BLOOD PRESSURE: 118 MMHG | HEART RATE: 67 BPM

## 2022-07-22 DIAGNOSIS — K04.7 DENTAL ABSCESS: Primary | ICD-10-CM

## 2022-07-22 PROCEDURE — 99282 EMERGENCY DEPT VISIT SF MDM: CPT

## 2022-07-22 RX ORDER — IBUPROFEN 800 MG/1
800 TABLET ORAL EVERY 8 HOURS PRN
Qty: 20 TABLET | Refills: 0 | Status: SHIPPED | OUTPATIENT
Start: 2022-07-22

## 2022-07-22 RX ORDER — CLINDAMYCIN HYDROCHLORIDE 300 MG/1
300 CAPSULE ORAL 4 TIMES DAILY
Qty: 28 CAPSULE | Refills: 0 | Status: SHIPPED | OUTPATIENT
Start: 2022-07-22

## 2022-07-22 RX ORDER — CLINDAMYCIN HYDROCHLORIDE 300 MG/1
300 CAPSULE ORAL ONCE
Status: DISCONTINUED | OUTPATIENT
Start: 2022-07-22 | End: 2022-07-22 | Stop reason: HOSPADM

## 2022-07-22 NOTE — ED PROVIDER NOTES
Time: 7:24 PM EDT  Arrived by: private car  Chief Complaint: Dental pain  History provided by: Patient  History is limited by: N/A     History of Present Illness:  Patient is a 27 y.o. year old male who presents to the emergency department with complaint of right lower jaw pain due to a dental abscess.  He states he was seen here told that it was a canker sore.  He does have multiple broken teeth and states that this is due to him not taking care of them his whole life.  He states that he used to drink a lot of Mountain Dew as well.  He has tried to get the tooth removed however his insurance lapsed.  His wife states that she got a private insurance for him for dental procedures however he will not be able to be seen until August 4.  He has taken ibuprofen and a combo pain medicine from his work which did help.          Patient Care Team  Primary Care Provider: Jareth Ritter APRN    Past Medical History:     Allergies   Allergen Reactions   • Shellfish-Derived Products Swelling   • Amoxicillin Rash     History reviewed. No pertinent past medical history.  History reviewed. No pertinent surgical history.  History reviewed. No pertinent family history.    Home Medications:  Prior to Admission medications    Medication Sig Start Date End Date Taking? Authorizing Provider   azithromycin (Zithromax Z-Kelton) 250 MG tablet Take 2 tablets by mouth on day 1, then 1 tablet daily on days 2-5 6/27/22   Jareth Ritter APRN   docusate sodium (Colace) 100 MG capsule Take 1 capsule by mouth 2 (Two) Times a Day. 11/30/21   Jareth Ritter APRN   predniSONE (DELTASONE) 20 MG tablet Take 2 tablets by mouth Daily. 3/22/22   Jareth Ritter APRN        Social History:   Social History     Tobacco Use   • Smoking status: Never Smoker   • Smokeless tobacco: Never Used   Vaping Use   • Vaping Use: Never used   Substance Use Topics   • Alcohol use: Never   • Drug use: Never     Recent travel: no     Review of Systems:  Review of Systems  "  Constitutional: Negative for chills and fever.   HENT: Positive for dental problem. Negative for congestion, ear pain, rhinorrhea and sore throat.    Eyes: Negative for pain.   Respiratory: Negative for cough and shortness of breath.    Cardiovascular: Negative for chest pain.   Gastrointestinal: Negative for abdominal pain, diarrhea, nausea and vomiting.   Genitourinary: Negative for decreased urine volume, dysuria and flank pain.   Musculoskeletal: Negative for arthralgias and myalgias.   Skin: Negative for rash.   Neurological: Negative for seizures and headaches.   All other systems reviewed and are negative.       Physical Exam:  /63 (BP Location: Right arm, Patient Position: Sitting)   Pulse 67   Temp 97.7 °F (36.5 °C) (Oral)   Resp 18   Ht 188 cm (74\")   Wt 74.2 kg (163 lb 9.3 oz)   SpO2 99%   BMI 21.00 kg/m²     Physical Exam  Vitals and nursing note reviewed.   Constitutional:       General: He is not in acute distress.     Appearance: Normal appearance. He is normal weight. He is not ill-appearing, toxic-appearing or diaphoretic.   HENT:      Head: Normocephalic and atraumatic.      Right Ear: External ear normal.      Left Ear: External ear normal.      Mouth/Throat:      Dentition: Abnormal dentition. Dental tenderness, gingival swelling, dental caries, dental abscesses and gum lesions present.     Eyes:      General: No scleral icterus.     Conjunctiva/sclera: Conjunctivae normal.      Pupils: Pupils are equal, round, and reactive to light.   Cardiovascular:      Rate and Rhythm: Normal rate.   Pulmonary:      Effort: Pulmonary effort is normal. No respiratory distress.   Abdominal:      General: There is no distension.      Tenderness: There is no abdominal tenderness.   Musculoskeletal:         General: No swelling, tenderness, deformity or signs of injury. Normal range of motion.      Cervical back: Normal range of motion and neck supple.   Skin:     General: Skin is warm and dry.      " Capillary Refill: Capillary refill takes less than 2 seconds.   Neurological:      General: No focal deficit present.      Mental Status: He is alert and oriented to person, place, and time.   Psychiatric:         Mood and Affect: Mood normal.         Behavior: Behavior normal.                Medications in the Emergency Department:  Medications   clindamycin (CLEOCIN) capsule 300 mg (300 mg Oral Not Given 7/22/22 2048)        Labs  Lab Results (last 24 hours)     ** No results found for the last 24 hours. **           Imaging:  No Radiology Exams Resulted Within Past 24 Hours    Procedures:  Procedures    Progress                            Medical Decision Making:  MDM  Number of Diagnoses or Management Options  Dental abscess: new and requires workup  Risk of Complications, Morbidity, and/or Mortality  Presenting problems: moderate  Diagnostic procedures: minimal  Management options: moderate    Patient Progress  Patient progress: stable       Final diagnoses:   Dental abscess        Disposition:  ED Disposition     ED Disposition   Discharge    Condition   Stable    Comment   --             This medical record created using voice recognition software.           Lenore Sargent, SONIA  07/22/22 6981

## 2022-09-13 ENCOUNTER — HOSPITAL ENCOUNTER (EMERGENCY)
Facility: HOSPITAL | Age: 27
Discharge: LEFT BEFORE DISCHARGE | End: 2022-09-13

## 2022-09-13 PROCEDURE — 99211 OFF/OP EST MAY X REQ PHY/QHP: CPT

## 2023-05-09 ENCOUNTER — TELEPHONE (OUTPATIENT)
Dept: INTERNAL MEDICINE | Facility: CLINIC | Age: 28
End: 2023-05-09
Payer: COMMERCIAL

## 2023-05-09 NOTE — TELEPHONE ENCOUNTER
Caller: FLOR MARTINEZ    Relationship: Emergency Contact    Best call back number: [phone]    What medication are you requesting: ANTIBIOTIC FOR UREAPLASMA    What are your current symptoms: WIFE WAS DIAGNOSED AND WIFE'S OBGYN STATES THAT IT COULD BE PASSED BACK AND FORTH    Have you had these symptoms before:    [] Yes  [x] No    Have you been treated for these symptoms before:   [] Yes  [x] No    If a prescription is needed, what is your preferred pharmacy and phone number: [facility]/PHARMACY #[id number] - [place], [place] - [address] [facility] [phone] [facility] [phone] FX     Additional notes:  WIFE HAS HAD MEDICAL ISSUES REPEATEDLY AND WIFE'S OBGYN THINKS THAT IT COULD BE GOING BACK AND FORTH BETWEEN PATIENT AND WIFE AND CAUSING HER TO CONTINUE GETTING ILLNESS.   PATIENT AND CALLER ARE UNSURE AS TO WHAT MEDICATION WOULD BE USED TO TREAT THIS FOR HIM.

## 2023-05-16 NOTE — TELEPHONE ENCOUNTER
Pt wife was rx'd zpak and is feeling better. She stated that they are staying abstinent until thy can get the pt treated as well. 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

## 2023-05-17 RX ORDER — AZITHROMYCIN 250 MG/1
TABLET, FILM COATED ORAL
Qty: 6 TABLET | Refills: 0 | Status: SHIPPED | OUTPATIENT
Start: 2023-05-17